# Patient Record
Sex: MALE | Race: WHITE | Employment: FULL TIME | URBAN - METROPOLITAN AREA
[De-identification: names, ages, dates, MRNs, and addresses within clinical notes are randomized per-mention and may not be internally consistent; named-entity substitution may affect disease eponyms.]

---

## 2018-05-21 ENCOUNTER — TELEPHONE (OUTPATIENT)
Dept: FAMILY MEDICINE CLINIC | Age: 33
End: 2018-05-21

## 2024-02-04 ENCOUNTER — HOSPITAL ENCOUNTER (EMERGENCY)
Age: 39
Discharge: HOME | End: 2024-02-05

## 2024-02-04 VITALS
SYSTOLIC BLOOD PRESSURE: 154 MMHG | RESPIRATION RATE: 16 BRPM | HEART RATE: 63 BPM | OXYGEN SATURATION: 98 % | DIASTOLIC BLOOD PRESSURE: 91 MMHG | TEMPERATURE: 97.7 F

## 2024-02-04 DIAGNOSIS — T48.5X5A RHINITIS MEDICAMENTOSA: Primary | ICD-10-CM

## 2024-02-04 DIAGNOSIS — J31.0 RHINITIS MEDICAMENTOSA: Primary | ICD-10-CM

## 2024-02-04 PROCEDURE — 99283 EMERGENCY DEPT VISIT LOW MDM: CPT

## 2024-02-05 RX ORDER — FLUTICASONE PROPIONATE 50 MCG
2 SPRAY, SUSPENSION (ML) NASAL DAILY
Qty: 16 G | Refills: 0 | Status: SHIPPED | OUTPATIENT
Start: 2024-02-05

## 2024-02-05 NOTE — ED PROVIDER NOTES
[facility]  ED  EMERGENCY DEPARTMENT ENCOUNTER        Pt Name: Benji Hughes  MRN: [id number]  Birthdate 1985  Date of evaluation: 2/4/2024  Provider: GLADYS Osborne CNP  PCP: No primary care provider on file.  Note Started: 8:11 AM EST 2/8/24    Evaluated by KEMAL. I have evaluated this patient.  My supervising physician was available for consultation.      CHIEF COMPLAINT       Chief Complaint   Patient presents with    Nasal Congestion     Pt to ED for nasal congestion, pt states he can't breathe out of his nose and thinks its seasonal allergies. Pt states this started earlier today around noon. Pt has nose spray he has been taking at home. Pt denies any swelling of the throat. Pt states he hasn't eaten anything new, taken new drugs or used new laundry detergent. Pt states it gets worse when he goes outside.        HISTORY OF PRESENT ILLNESS: 1 or more Elements     History From: Patient  Limitations to history : None    Benji Hughes is a 38 y.o. male who presents to ER with nasal congestion. Thinks he is having an allergic reaction. Congestion that is progressively getting worse and if he does not use his nasal spray every 15 minutes he gets all clogged.  He reports that his symptoms started today around noon.  The spray he is using is phenylephrine.  He denies fever, chills, sweats.  He denies any new exposures to soaps, detergents, food or medication.  Stating that the congestion will worsen when he goes outside.  He is very distressed about the fact that he cannot breathe through his nose without using the nasal spray.  States that he has used oxymetazoline as well.  There is no shortness of breath, cough.  No other symptoms.    Nursing Notes were all reviewed and agreed with or any disagreements were addressed in the HPI.    REVIEW OF SYSTEMS :      Review of Systems    Positives and Pertinent negatives as per HPI.     MEDICAL HISTORY     Past Medical History:

## 2024-02-05 NOTE — DISCHARGE INSTRUCTIONS
Stop using the decongestant nasal sprays.     Flonase 1 spray each nostril daily. No more often than this.    Can use nasal saline spray as well. Just saline.